# Patient Record
Sex: MALE | URBAN - METROPOLITAN AREA
[De-identification: names, ages, dates, MRNs, and addresses within clinical notes are randomized per-mention and may not be internally consistent; named-entity substitution may affect disease eponyms.]

---

## 2023-02-27 ENCOUNTER — SPECIALTY PHARMACY (OUTPATIENT)
Dept: PHARMACY | Facility: CLINIC | Age: 38
End: 2023-02-27
Payer: COMMERCIAL

## 2023-03-02 NOTE — TELEPHONE ENCOUNTER
Test claim indicates refill too soon until 4/23/23, last filled at [facility] on 2/20/23.    Outgoing call to pt to inform of Botox rx received and refill too soon. Pt reports he has been on treatment for years and has been receiving medication through [facility].  Pt lately has been having issues with [facility] and wishes to fill at [facility] moving forward.  Pt has an appointment upcoming on 3/22 in which Botox has been received. Pt normally receives treatment every 3 to 4 months.      Pending out call until Camila 15 to reassess next appointment to set up shipment. Pt agreed.

## 2023-03-07 NOTE — TELEPHONE ENCOUNTER
Benefit Investigation    Botox  [facility] per [facility] website  Estimated copay: $120  Deductible: $0  Max OOP: $4500 ($122.50 accumulated)  [facility] in network    No PA required julio césar Goodrich

## 2023-06-26 ENCOUNTER — SPECIALTY PHARMACY (OUTPATIENT)
Dept: PHARMACY | Facility: CLINIC | Age: 38
End: 2023-06-26
Payer: COMMERCIAL

## 2023-06-26 DIAGNOSIS — G51.39 HEMIFACIAL SPASM, UNSPECIFIED LATERALITY: Primary | ICD-10-CM

## 2023-06-26 RX ORDER — TRIHEXYPHENIDYL HYDROCHLORIDE 2 MG/1
0.5 TABLET ORAL 2 TIMES DAILY
COMMUNITY

## 2023-06-26 RX ORDER — LOSARTAN POTASSIUM AND HYDROCHLOROTHIAZIDE 25; 100 MG/1; MG/1
1 TABLET ORAL DAILY
COMMUNITY

## 2023-07-25 NOTE — TELEPHONE ENCOUNTER
Left message at clinic to confirm  delivery, appt on 8/1. Need address/name of person receiving/office hours/lunch hours.

## 2023-07-26 NOTE — TELEPHONE ENCOUNTER
Confirmed shipment with Allison ARNOLD for Friday 7/28 for appt 8/1 to    [address] [facility] [address]   ATTN: [id number]  Office hours: 8am - 4pm  No lunch closure

## 2024-03-14 PROBLEM — G51.39 HEMIFACIAL SPASM: Status: ACTIVE | Noted: 2024-03-14
